# Patient Record
Sex: MALE | Race: WHITE | NOT HISPANIC OR LATINO | Employment: OTHER | URBAN - METROPOLITAN AREA
[De-identification: names, ages, dates, MRNs, and addresses within clinical notes are randomized per-mention and may not be internally consistent; named-entity substitution may affect disease eponyms.]

---

## 2022-11-01 ENCOUNTER — OFFICE VISIT (OUTPATIENT)
Dept: NEUROSURGERY | Facility: CLINIC | Age: 62
End: 2022-11-01

## 2022-11-01 VITALS — WEIGHT: 216.2 LBS | TEMPERATURE: 97.3 F | HEIGHT: 74 IN | BODY MASS INDEX: 27.75 KG/M2

## 2022-11-01 DIAGNOSIS — C79.51 SPINE METASTASIS: Primary | ICD-10-CM

## 2022-11-01 PROCEDURE — 99203 OFFICE O/P NEW LOW 30 MIN: CPT | Performed by: NEUROLOGICAL SURGERY

## 2022-11-01 RX ORDER — CAPECITABINE 500 MG/1
TABLET, FILM COATED ORAL
COMMUNITY
Start: 2022-10-11

## 2022-11-01 RX ORDER — ONDANSETRON HYDROCHLORIDE 8 MG/1
TABLET, FILM COATED ORAL
COMMUNITY
Start: 2022-10-03

## 2022-11-01 RX ORDER — METOPROLOL TARTRATE 75 MG/1
TABLET, FILM COATED ORAL
COMMUNITY
Start: 2022-10-03

## 2022-11-01 RX ORDER — LISINOPRIL 5 MG/1
TABLET ORAL
COMMUNITY
Start: 2022-10-03

## 2022-11-01 NOTE — PROGRESS NOTES
Patient: Zane Palm  : 1960    Primary Care Provider: Elena Duenas MD    Requesting Provider: Dr. Tony López        History    Chief Complaint: Low back pain with tingling in the right leg.    History of Present Illness: Mr. Palm is a 62-year-old disabled  with a history of metastatic colon cancer.  He describes 6-8 months of low back pain.  He has tingling extending into the right shin but no leg pain.  At one point he underwent 10 fractions of radiation to his lumbar spine.  Dr. López's notes indicate that that was the L5-S1 level, completed 3/4/2022.  His pain is worse with lying down or sitting.  He is actually felt a good deal better over the last 1.5 weeks.  No position is helpful.  He denies bowel or bladder dysfunction.    Review of Systems   Constitutional: Negative for activity change, appetite change, chills, diaphoresis, fatigue, fever and unexpected weight change.   HENT: Negative for congestion, dental problem, drooling, ear discharge, ear pain, facial swelling, hearing loss, mouth sores, nosebleeds, postnasal drip, rhinorrhea, sinus pressure, sinus pain, sneezing, sore throat, tinnitus, trouble swallowing and voice change.    Eyes: Negative for photophobia, pain, discharge, redness, itching and visual disturbance.   Respiratory: Negative for apnea, cough, choking, chest tightness, shortness of breath, wheezing and stridor.    Cardiovascular: Negative for chest pain, palpitations and leg swelling.   Gastrointestinal: Negative for abdominal distention, abdominal pain, anal bleeding, blood in stool, constipation, diarrhea, nausea, rectal pain and vomiting.   Endocrine: Negative for cold intolerance, heat intolerance, polydipsia, polyphagia and polyuria.   Genitourinary: Negative for decreased urine volume, difficulty urinating, dysuria, enuresis, flank pain, frequency, genital sores, hematuria, penile discharge, penile pain, penile swelling, scrotal  "swelling, testicular pain and urgency.   Musculoskeletal: Positive for back pain. Negative for arthralgias, gait problem, joint swelling, myalgias, neck pain and neck stiffness.   Skin: Negative for color change, pallor, rash and wound.   Allergic/Immunologic: Negative for environmental allergies, food allergies and immunocompromised state.   Neurological: Positive for numbness. Negative for dizziness, tremors, seizures, syncope, facial asymmetry, speech difficulty, weakness, light-headedness and headaches.   Hematological: Negative for adenopathy. Does not bruise/bleed easily.   Psychiatric/Behavioral: Negative for agitation, behavioral problems, confusion, decreased concentration, dysphoric mood, hallucinations, self-injury, sleep disturbance and suicidal ideas. The patient is not nervous/anxious and is not hyperactive.    All other systems reviewed and are negative.      The patient's past medical history, past surgical history, family history, and social history have been reviewed at length in the electronic medical record.      Physical Exam:   Temp 97.3 °F (36.3 °C)   Ht 188 cm (74\")   Wt 98.1 kg (216 lb 3.2 oz)   BMI 27.76 kg/m²   CONSTITUTIONAL: Patient is well-nourished, pleasant and appears stated age.  MUSCULOSKELETAL:  Straight leg raising is negative.  Juan Manuel's Sign is negative.  ROM in the low back is normal.  Tenderness in the back to palpation is not observed.  NEUROLOGICAL:  Orientation, memory, attention span, language function, and cognition have been examined and are intact.  Strength is intact in the lower extremities to direct testing.  Muscle tone is normal throughout.  Station and gait are normal.  Sensation is altered to light touch testing in the right medial and lateral calf as well as the shin.  Both feet are numb from neuropathy.  Deep tendon reflexes are absent at the ankles and the right knee and 1+ at the left knee.  Coordination is intact.      Medical Decision Making    Data " Review:   (All imaging studies were personally reviewed unless stated otherwise)  MRI of the lumbar spine demonstrates some signal change within the L5-S1 vertebrae that may be a result of radiotherapy.  There is a large mass eroding the right half of the L4 vertebral body.  Some of this lesion extends into the foramen and could account for L4 radicular symptoms.  A good deal of the tissue extends outside of the vertebrae on the right.    Diagnosis:   1.  L4 metastasis.  2.  Right L4 radiculopathy.  3.  Metastatic colon cancer.    Treatment Options:   I do not think there is any role for conventional surgery here but I am going to refer him to radiation oncology for consideration of CyberKnife radiosurgery.  That might be helpful in palliating his pain.  He would probably require 3-5 fractions.  Previous radiation ports will need to be assessed as well.       Diagnosis Plan   1. Spine metastasis ([facility]CC)            Scribed for Rod Denney MD by CHETNA Watt 11/1/2022 12:22 EDT      I, Dr. Denney, personally performed the services described in the documentation, as scribed in my presence, and it is both accurate and complete.